# Patient Record
Sex: MALE | URBAN - METROPOLITAN AREA
[De-identification: names, ages, dates, MRNs, and addresses within clinical notes are randomized per-mention and may not be internally consistent; named-entity substitution may affect disease eponyms.]

---

## 2021-11-18 ENCOUNTER — OFFICE VISIT (OUTPATIENT)
Dept: URBAN - METROPOLITAN AREA CLINIC 42 | Facility: CLINIC | Age: 76
End: 2021-11-18
Payer: MEDICARE

## 2021-11-18 DIAGNOSIS — H25.13 AGE-RELATED NUCLEAR CATARACT, BILATERAL: ICD-10-CM

## 2021-11-18 DIAGNOSIS — E11.3293 DIABETES MELLITUS TYPE 2 WITH MILD NON-PROLIFERATIVE RETINOPATHY WITHOUT MACULAR EDEMA, BILATERAL: Primary | ICD-10-CM

## 2021-11-18 PROCEDURE — 99204 OFFICE O/P NEW MOD 45 MIN: CPT | Performed by: OPHTHALMOLOGY

## 2021-11-18 ASSESSMENT — INTRAOCULAR PRESSURE
OD: 14
OS: 14

## 2021-11-18 NOTE — IMPRESSION/PLAN
Impression: Diabetes mellitus Type 2 with mild non-proliferative retinopathy without macular edema, bilateral: A85.0542.  Plan: Control BS levels

## 2021-11-18 NOTE — IMPRESSION/PLAN
Impression: Age-related nuclear cataract, bilateral: H25.13.  Plan: Monitor patient will notify with any visual changes

## 2021-11-24 ENCOUNTER — OFFICE VISIT (OUTPATIENT)
Dept: URBAN - METROPOLITAN AREA CLINIC 13 | Facility: CLINIC | Age: 76
End: 2021-11-24
Payer: MEDICARE

## 2021-11-24 DIAGNOSIS — E11.3593 TYPE 2 DIAB WITH PROLIF DIAB RTNOP WITHOUT MACULAR EDEMA, BI: ICD-10-CM

## 2021-11-24 DIAGNOSIS — H43.393 OTHER VITREOUS OPACITIES, BILATERAL: Primary | ICD-10-CM

## 2021-11-24 PROCEDURE — 92134 CPTRZ OPH DX IMG PST SGM RTA: CPT | Performed by: OPHTHALMOLOGY

## 2021-11-24 PROCEDURE — 99204 OFFICE O/P NEW MOD 45 MIN: CPT | Performed by: OPHTHALMOLOGY

## 2021-11-24 PROCEDURE — 92250 FUNDUS PHOTOGRAPHY W/I&R: CPT | Performed by: OPHTHALMOLOGY

## 2021-11-24 PROCEDURE — 92235 FLUORESCEIN ANGRPH MLTIFRAME: CPT | Performed by: OPHTHALMOLOGY

## 2021-11-24 ASSESSMENT — INTRAOCULAR PRESSURE
OD: 15
OS: 16

## 2021-11-24 NOTE — IMPRESSION/PLAN
Impression: Type 2 diab with prolif diab rtnop without macular edema, bi: [id number]. 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 If patient elects surgery for vitreous opacities, recommend Avastin injections prior to surgery to decrease risk for [facility]. Recommend PRP treatment OU if patient defers surgery for vitreous opacities.

## 2021-11-24 NOTE — IMPRESSION/PLAN
Impression: Other vitreous opacities, bilateral: H43.393. (OS>OD) Plan: Treated with injections for 2 years. Last treated 2 years ago. Has developed visually significant floaters in both eyes. Discussed PPV to remove floaters including risks of surgery. Patient wishes to consider this further and will notify us if he wishes to proceed with surgery. 

Plan: PPV, PRP x Vit Opac/PDR OS with Avastin prior to surgery

## 2024-03-22 ENCOUNTER — OFFICE VISIT (OUTPATIENT)
Dept: URBAN - METROPOLITAN AREA CLINIC 8 | Facility: CLINIC | Age: 79
End: 2024-03-22
Payer: MEDICARE

## 2024-03-22 DIAGNOSIS — Z86.73 PERSONAL HISTORY OF STROKE W/O RESIDUAL DEFICIT: ICD-10-CM

## 2024-03-22 DIAGNOSIS — H25.813 COMBINED FORMS OF AGE-RELATED CATARACT, BILATERAL: ICD-10-CM

## 2024-03-22 DIAGNOSIS — H49.22 SIXTH [ABDUCENT] NERVE PALSY, LEFT EYE: Primary | ICD-10-CM

## 2024-03-22 DIAGNOSIS — E11.3593 DIABETES MELLITUS TYPE 2 WITH PROLIFERATIVE RETINOPATHY WITHOUT MACULAR EDEMA, BILATERAL: ICD-10-CM

## 2024-03-22 PROCEDURE — 92134 CPTRZ OPH DX IMG PST SGM RTA: CPT

## 2024-03-22 PROCEDURE — 99204 OFFICE O/P NEW MOD 45 MIN: CPT

## 2024-03-22 ASSESSMENT — INTRAOCULAR PRESSURE
OS: 14
OD: 14

## 2024-03-27 ENCOUNTER — OFFICE VISIT (OUTPATIENT)
Dept: URBAN - METROPOLITAN AREA CLINIC 13 | Facility: CLINIC | Age: 79
End: 2024-03-27
Payer: MEDICARE

## 2024-03-27 DIAGNOSIS — H43.11 VITREOUS HEMORRHAGE, RIGHT EYE: Primary | ICD-10-CM

## 2024-03-27 DIAGNOSIS — H43.393 OTHER VITREOUS OPACITIES, BILATERAL: ICD-10-CM

## 2024-03-27 DIAGNOSIS — E11.3593 TYPE 2 DIAB WITH PROLIF DIAB RTNOP WITHOUT MACULAR EDEMA, BI: ICD-10-CM

## 2024-03-27 PROCEDURE — 67028 INJECTION EYE DRUG: CPT | Performed by: OPHTHALMOLOGY

## 2024-03-27 PROCEDURE — 92134 CPTRZ OPH DX IMG PST SGM RTA: CPT | Performed by: OPHTHALMOLOGY

## 2024-03-27 PROCEDURE — 92014 COMPRE OPH EXAM EST PT 1/>: CPT | Performed by: OPHTHALMOLOGY

## 2024-03-27 ASSESSMENT — INTRAOCULAR PRESSURE
OD: 18
OS: 17

## 2024-04-11 ENCOUNTER — POST-OPERATIVE VISIT (OUTPATIENT)
Dept: URBAN - METROPOLITAN AREA CLINIC 7 | Facility: CLINIC | Age: 79
End: 2024-04-11
Payer: MEDICARE

## 2024-04-11 DIAGNOSIS — H43.11 VITREOUS HEMORRHAGE, RIGHT EYE: Primary | ICD-10-CM

## 2024-04-11 PROCEDURE — 99024 POSTOP FOLLOW-UP VISIT: CPT | Performed by: OPHTHALMOLOGY

## 2024-04-18 ENCOUNTER — POST-OPERATIVE VISIT (OUTPATIENT)
Dept: URBAN - METROPOLITAN AREA CLINIC 7 | Facility: CLINIC | Age: 79
End: 2024-04-18
Payer: MEDICARE

## 2024-04-18 DIAGNOSIS — H43.11 VITREOUS HEMORRHAGE, RIGHT EYE: Primary | ICD-10-CM

## 2024-04-18 PROCEDURE — 99024 POSTOP FOLLOW-UP VISIT: CPT | Performed by: OPHTHALMOLOGY

## 2024-04-18 ASSESSMENT — INTRAOCULAR PRESSURE
OS: 15
OD: 13

## 2024-05-16 ENCOUNTER — POST-OPERATIVE VISIT (OUTPATIENT)
Dept: URBAN - METROPOLITAN AREA CLINIC 7 | Facility: CLINIC | Age: 79
End: 2024-05-16
Payer: MEDICARE

## 2024-05-16 DIAGNOSIS — H43.11 VITREOUS HEMORRHAGE, RIGHT EYE: Primary | ICD-10-CM

## 2024-05-16 PROCEDURE — 99024 POSTOP FOLLOW-UP VISIT: CPT | Performed by: OPHTHALMOLOGY

## 2024-05-16 ASSESSMENT — INTRAOCULAR PRESSURE
OS: 17
OD: 21

## 2024-05-30 ENCOUNTER — POST-OPERATIVE VISIT (OUTPATIENT)
Dept: URBAN - METROPOLITAN AREA CLINIC 7 | Facility: CLINIC | Age: 79
End: 2024-05-30
Payer: MEDICARE

## 2024-05-30 DIAGNOSIS — H43.11 VITREOUS HEMORRHAGE, RIGHT EYE: Primary | ICD-10-CM

## 2024-05-30 PROCEDURE — 99024 POSTOP FOLLOW-UP VISIT: CPT | Performed by: OPHTHALMOLOGY

## 2024-05-30 ASSESSMENT — INTRAOCULAR PRESSURE
OS: 17
OD: 19

## 2024-06-06 ENCOUNTER — POST-OPERATIVE VISIT (OUTPATIENT)
Dept: URBAN - METROPOLITAN AREA CLINIC 7 | Facility: CLINIC | Age: 79
End: 2024-06-06
Payer: MEDICARE

## 2024-06-06 DIAGNOSIS — H33.41 TRACTION DETACHMENT OF RETINA, RIGHT EYE: Primary | ICD-10-CM

## 2024-06-06 PROCEDURE — 99024 POSTOP FOLLOW-UP VISIT: CPT | Performed by: OPHTHALMOLOGY

## 2024-06-06 ASSESSMENT — INTRAOCULAR PRESSURE
OS: 20
OD: 14

## 2024-07-25 ENCOUNTER — POST-OPERATIVE VISIT (OUTPATIENT)
Dept: URBAN - METROPOLITAN AREA CLINIC 7 | Facility: CLINIC | Age: 79
End: 2024-07-25
Payer: MEDICARE

## 2024-07-25 DIAGNOSIS — H33.41 TRACTION DETACHMENT OF RETINA, RIGHT EYE: Primary | ICD-10-CM

## 2024-07-25 PROCEDURE — 99024 POSTOP FOLLOW-UP VISIT: CPT | Performed by: OPHTHALMOLOGY

## 2024-07-25 ASSESSMENT — INTRAOCULAR PRESSURE
OS: 13
OD: 11

## 2024-09-05 ENCOUNTER — OFFICE VISIT (OUTPATIENT)
Dept: URBAN - METROPOLITAN AREA CLINIC 7 | Facility: CLINIC | Age: 79
End: 2024-09-05
Payer: MEDICARE

## 2024-09-05 DIAGNOSIS — H33.41 TRACTION DETACHMENT OF RETINA, RIGHT EYE: Primary | ICD-10-CM

## 2024-09-05 DIAGNOSIS — E11.3593 TYPE 2 DIAB WITH PROLIF DIAB RTNOP WITHOUT MACULAR EDEMA, BI: ICD-10-CM

## 2024-09-05 PROCEDURE — 92134 CPTRZ OPH DX IMG PST SGM RTA: CPT | Performed by: OPHTHALMOLOGY

## 2024-09-05 PROCEDURE — 92014 COMPRE OPH EXAM EST PT 1/>: CPT | Performed by: OPHTHALMOLOGY

## 2024-09-05 ASSESSMENT — INTRAOCULAR PRESSURE
OS: 15
OD: 13

## 2025-01-09 ENCOUNTER — OFFICE VISIT (OUTPATIENT)
Dept: URBAN - METROPOLITAN AREA CLINIC 7 | Facility: CLINIC | Age: 80
End: 2025-01-09
Payer: COMMERCIAL

## 2025-01-09 DIAGNOSIS — E11.3593 TYPE 2 DIAB WITH PROLIF DIAB RTNOP WITHOUT MACULAR EDEMA, BI: ICD-10-CM

## 2025-01-09 DIAGNOSIS — H33.41 TRACTION DETACHMENT OF RETINA, RIGHT EYE: Primary | ICD-10-CM

## 2025-01-09 DIAGNOSIS — H26.9 CATARACT: ICD-10-CM

## 2025-01-09 PROCEDURE — 92014 COMPRE OPH EXAM EST PT 1/>: CPT | Performed by: OPHTHALMOLOGY

## 2025-01-09 PROCEDURE — 92134 CPTRZ OPH DX IMG PST SGM RTA: CPT | Performed by: OPHTHALMOLOGY

## 2025-01-09 RX ORDER — PREDNISOLONE ACETATE 10 MG/ML
1 % SUSPENSION/ DROPS OPHTHALMIC
Qty: 5 | Refills: 3 | Status: INACTIVE
Start: 2025-01-09 | End: 2025-04-08

## 2025-01-09 RX ORDER — CIPROFLOXACIN HYDROCHLORIDE 3 MG/ML
0.3 % SOLUTION OPHTHALMIC
Qty: 5 | Refills: 1 | Status: INACTIVE
Start: 2025-01-09 | End: 2025-02-07

## 2025-01-09 ASSESSMENT — INTRAOCULAR PRESSURE
OS: 12
OD: 10

## 2025-01-30 ENCOUNTER — POST-OPERATIVE VISIT (OUTPATIENT)
Dept: URBAN - METROPOLITAN AREA CLINIC 13 | Facility: CLINIC | Age: 80
End: 2025-01-30
Payer: MEDICARE

## 2025-01-30 PROCEDURE — 99024 POSTOP FOLLOW-UP VISIT: CPT | Performed by: OPHTHALMOLOGY

## 2025-01-30 ASSESSMENT — INTRAOCULAR PRESSURE
OS: 16
OD: 34

## 2025-02-01 ENCOUNTER — POST-OPERATIVE VISIT (OUTPATIENT)
Dept: URBAN - METROPOLITAN AREA CLINIC 7 | Facility: CLINIC | Age: 80
End: 2025-02-01
Payer: MEDICARE

## 2025-02-01 DIAGNOSIS — H33.41 TRACTION DETACHMENT OF RETINA, RIGHT EYE: Primary | ICD-10-CM

## 2025-02-01 PROCEDURE — 99024 POSTOP FOLLOW-UP VISIT: CPT | Performed by: OPHTHALMOLOGY

## 2025-02-01 ASSESSMENT — INTRAOCULAR PRESSURE
OS: 14
OD: 16

## 2025-02-06 ENCOUNTER — POST-OPERATIVE VISIT (OUTPATIENT)
Dept: URBAN - METROPOLITAN AREA CLINIC 7 | Facility: CLINIC | Age: 80
End: 2025-02-06
Payer: MEDICARE

## 2025-02-06 DIAGNOSIS — H33.41 TRACTION DETACHMENT OF RETINA, RIGHT EYE: Primary | ICD-10-CM

## 2025-02-06 PROCEDURE — 99024 POSTOP FOLLOW-UP VISIT: CPT | Performed by: OPHTHALMOLOGY

## 2025-03-06 ENCOUNTER — POST-OPERATIVE VISIT (OUTPATIENT)
Dept: URBAN - METROPOLITAN AREA CLINIC 7 | Facility: CLINIC | Age: 80
End: 2025-03-06
Payer: MEDICARE

## 2025-03-06 DIAGNOSIS — H33.41 TRACTION DETACHMENT OF RETINA, RIGHT EYE: Primary | ICD-10-CM

## 2025-03-06 ASSESSMENT — INTRAOCULAR PRESSURE
OD: 15
OS: 13

## 2025-04-17 ENCOUNTER — POST-OPERATIVE VISIT (OUTPATIENT)
Dept: URBAN - METROPOLITAN AREA CLINIC 7 | Facility: CLINIC | Age: 80
End: 2025-04-17
Payer: MEDICARE

## 2025-04-17 DIAGNOSIS — H33.41 TRACTION DETACHMENT OF RETINA, RIGHT EYE: Primary | ICD-10-CM

## 2025-04-17 PROCEDURE — 92134 CPTRZ OPH DX IMG PST SGM RTA: CPT | Performed by: OPHTHALMOLOGY

## 2025-04-17 PROCEDURE — 99024 POSTOP FOLLOW-UP VISIT: CPT | Performed by: OPHTHALMOLOGY

## 2025-04-17 ASSESSMENT — INTRAOCULAR PRESSURE
OD: 22
OS: 10

## 2025-07-03 ENCOUNTER — OFFICE VISIT (OUTPATIENT)
Dept: URBAN - METROPOLITAN AREA CLINIC 7 | Facility: CLINIC | Age: 80
End: 2025-07-03
Payer: MEDICARE

## 2025-07-03 DIAGNOSIS — E11.3592 TYPE 2 DIAB WITH PROLIF DIAB RTNOP WITHOUT MCLR EDEMA, L EYE: ICD-10-CM

## 2025-07-03 DIAGNOSIS — H33.41 TRACTION DETACHMENT OF RETINA, RIGHT EYE: Primary | ICD-10-CM

## 2025-07-03 PROCEDURE — 99214 OFFICE O/P EST MOD 30 MIN: CPT | Performed by: OPHTHALMOLOGY

## 2025-07-03 PROCEDURE — 92134 CPTRZ OPH DX IMG PST SGM RTA: CPT | Performed by: OPHTHALMOLOGY

## 2025-07-03 ASSESSMENT — INTRAOCULAR PRESSURE
OS: 13
OD: 12

## 2025-07-10 ENCOUNTER — POST-OPERATIVE VISIT (OUTPATIENT)
Dept: URBAN - METROPOLITAN AREA CLINIC 7 | Facility: CLINIC | Age: 80
End: 2025-07-10
Payer: MEDICARE

## 2025-07-10 DIAGNOSIS — H33.41 TRACTION DETACHMENT OF RETINA, RIGHT EYE: Primary | ICD-10-CM

## 2025-07-10 PROCEDURE — 99024 POSTOP FOLLOW-UP VISIT: CPT | Performed by: OPHTHALMOLOGY

## 2025-07-10 ASSESSMENT — INTRAOCULAR PRESSURE
OD: 9
OS: 17